# Patient Record
Sex: MALE | Race: BLACK OR AFRICAN AMERICAN | NOT HISPANIC OR LATINO | URBAN - METROPOLITAN AREA
[De-identification: names, ages, dates, MRNs, and addresses within clinical notes are randomized per-mention and may not be internally consistent; named-entity substitution may affect disease eponyms.]

---

## 2021-01-21 ENCOUNTER — OFFICE VISIT (OUTPATIENT)
Dept: URBAN - METROPOLITAN AREA CLINIC 109 | Facility: CLINIC | Age: 71
End: 2021-01-21
Payer: MEDICARE

## 2021-01-21 ENCOUNTER — DASHBOARD ENCOUNTERS (OUTPATIENT)
Age: 71
End: 2021-01-21

## 2021-01-21 ENCOUNTER — WEB ENCOUNTER (OUTPATIENT)
Dept: URBAN - METROPOLITAN AREA CLINIC 109 | Facility: CLINIC | Age: 71
End: 2021-01-21

## 2021-01-21 DIAGNOSIS — E66.9 OBESITY (BMI 30-39.9): ICD-10-CM

## 2021-01-21 DIAGNOSIS — K21.9 GERD (GASTROESOPHAGEAL REFLUX DISEASE): ICD-10-CM

## 2021-01-21 DIAGNOSIS — Z86.010 HISTORY OF COLON POLYPS: ICD-10-CM

## 2021-01-21 PROBLEM — 38341003 HYPERTENSION: Status: ACTIVE | Noted: 2021-01-21

## 2021-01-21 PROBLEM — 698065002 ACID REFLUX: Status: ACTIVE | Noted: 2021-01-21

## 2021-01-21 PROBLEM — 235595009 GASTROESOPHAGEAL REFLUX DISEASE: Status: ACTIVE | Noted: 2021-01-21

## 2021-01-21 PROBLEM — 428283002 HISTORY OF POLYP OF COLON: Status: ACTIVE | Noted: 2021-01-21

## 2021-01-21 PROBLEM — 414916001 OBESITY: Status: ACTIVE | Noted: 2021-01-21

## 2021-01-21 PROCEDURE — G8427 DOCREV CUR MEDS BY ELIG CLIN: HCPCS | Performed by: INTERNAL MEDICINE

## 2021-01-21 PROCEDURE — G8417 CALC BMI ABV UP PARAM F/U: HCPCS | Performed by: INTERNAL MEDICINE

## 2021-01-21 PROCEDURE — G8482 FLU IMMUNIZE ORDER/ADMIN: HCPCS | Performed by: INTERNAL MEDICINE

## 2021-01-21 PROCEDURE — 3017F COLORECTAL CA SCREEN DOC REV: CPT | Performed by: INTERNAL MEDICINE

## 2021-01-21 PROCEDURE — 99212 OFFICE O/P EST SF 10 MIN: CPT | Performed by: INTERNAL MEDICINE

## 2021-01-21 RX ORDER — OMEPRAZOLE 40 MG/1
TAKE 1 CAPSULE (40 MG) BY ORAL ROUTE ONCE DAILY BEFORE A MEAL FOR 90 DAYS CAPSULE, DELAYED RELEASE PELLETS ORAL 1
Qty: 90 CAPSULE | Refills: 3 | OUTPATIENT
Start: 2021-01-21

## 2021-01-21 RX ORDER — BISOPROLOL/HYDROCHLOROTHIAZIDE 10-6.25 MG
TAKE 1 TABLET BY ORAL ROUTE ONCE DAILY TABLET ORAL 1
Qty: 0 | Refills: 0 | Status: ACTIVE | COMMUNITY
Start: 1900-01-01

## 2021-01-21 NOTE — HPI-TODAY'S VISIT:
The patient returns for f/u care of GERD.  His doing fairly well. No dysphagia.  Heartburn controlled with omeprazole 20mg q AM.  Rarely requires AAs.  He has a history of colon polyps.  No polyps at last study in 2017, due in 2022 for surveillance colonoscopy.

## 2021-04-02 ENCOUNTER — TELEPHONE ENCOUNTER (OUTPATIENT)
Dept: URBAN - METROPOLITAN AREA CLINIC 23 | Facility: CLINIC | Age: 71
End: 2021-04-02

## 2022-11-15 ENCOUNTER — OFFICE VISIT (OUTPATIENT)
Dept: URBAN - METROPOLITAN AREA CLINIC 109 | Facility: CLINIC | Age: 72
End: 2022-11-15

## 2023-02-06 ENCOUNTER — OFFICE VISIT (OUTPATIENT)
Dept: URBAN - METROPOLITAN AREA CLINIC 109 | Facility: CLINIC | Age: 73
End: 2023-02-06

## 2023-02-06 RX ORDER — OMEPRAZOLE 40 MG/1
TAKE 1 CAPSULE (40 MG) BY ORAL ROUTE ONCE DAILY BEFORE A MEAL FOR 90 DAYS CAPSULE, DELAYED RELEASE PELLETS ORAL 1
Qty: 90 CAPSULE | Refills: 3 | COMMUNITY
Start: 2021-01-21

## 2023-02-06 RX ORDER — BISOPROLOL/HYDROCHLOROTHIAZIDE 10-6.25 MG
TAKE 1 TABLET BY ORAL ROUTE ONCE DAILY TABLET ORAL 1
Qty: 0 | Refills: 0 | COMMUNITY
Start: 1900-01-01